# Patient Record
Sex: FEMALE | Race: WHITE | NOT HISPANIC OR LATINO | URBAN - METROPOLITAN AREA
[De-identification: names, ages, dates, MRNs, and addresses within clinical notes are randomized per-mention and may not be internally consistent; named-entity substitution may affect disease eponyms.]

---

## 2023-01-24 ENCOUNTER — APPOINTMENT (RX ONLY)
Dept: URBAN - METROPOLITAN AREA CLINIC 333 | Facility: CLINIC | Age: 33
Setting detail: DERMATOLOGY
End: 2023-01-24

## 2023-01-24 DIAGNOSIS — L72.8 OTHER FOLLICULAR CYSTS OF THE SKIN AND SUBCUTANEOUS TISSUE: ICD-10-CM | Status: INADEQUATELY CONTROLLED

## 2023-01-24 PROCEDURE — ? REFERRAL

## 2023-01-24 PROCEDURE — 99212 OFFICE O/P EST SF 10 MIN: CPT

## 2023-01-24 PROCEDURE — ? COUNSELING

## 2023-01-24 NOTE — HPI: CYST
Is This A New Presentation, Or A Follow-Up?: Follow Up Cyst
Additional History: \\n\\nPatient states there is multiple bumps near groin area.

## 2025-07-22 ENCOUNTER — LAB OUTSIDE AN ENCOUNTER (OUTPATIENT)
Dept: URBAN - METROPOLITAN AREA CLINIC 60 | Facility: CLINIC | Age: 35
End: 2025-07-22

## 2025-07-22 ENCOUNTER — OFFICE VISIT (OUTPATIENT)
Dept: URBAN - METROPOLITAN AREA CLINIC 60 | Facility: CLINIC | Age: 35
End: 2025-07-22
Payer: SELF-PAY

## 2025-07-22 DIAGNOSIS — R10.13 DYSPEPSIA: ICD-10-CM

## 2025-07-22 DIAGNOSIS — K52.9 CHRONIC DIARRHEA: ICD-10-CM

## 2025-07-22 DIAGNOSIS — R10.11 RUQ ABDOMINAL PAIN: ICD-10-CM

## 2025-07-22 PROCEDURE — 99204 OFFICE O/P NEW MOD 45 MIN: CPT

## 2025-07-22 RX ORDER — FERROUS SULFATE 325(65) MG
1 TABLET TABLET ORAL
Qty: 12 | Status: ACTIVE | COMMUNITY
Start: 2025-07-21

## 2025-07-22 RX ORDER — OMEPRAZOLE 20 MG/1
1 CAPSULE 1/2 TO 1 HOUR BEFORE MORNING MEAL CAPSULE, DELAYED RELEASE ORAL ONCE A DAY
Qty: 90 | Refills: 0 | OUTPATIENT
Start: 2025-07-22

## 2025-07-22 NOTE — PHYSICAL EXAM GASTROINTESTINAL
Abdomen , soft, upper abdominal tenderness, nondistended , no guarding or rigidity , no masses palpable , normal bowel sounds , Liver and Spleen,  no hepatosplenomegaly , liver nontender

## 2025-07-22 NOTE — HPI-TODAY'S VISIT:
Patient is a 35-year-old female presents today as a new patient with multiple GI complaints.  Patient states that for the past 3 weeks she has been experiencing intermittent heartburn symptoms.  She has not noticed any association with certain foods however sometimes she feels as though her food gets stuck in her lower esophagus and she has to drink fluids in order to get the food to go down completely.  She has tried OTC Pepcid and Tums without much symptom relief.  She has also been experiencing right upper quadrant pain intermittently for the past week.  She notices that her symptoms do worsen after eating a meal.  She denies any vomiting.  Lastly she has also had daily diarrhea for years with 2-3 bowel movements per day.  She denies hematochezia and melena.  She has never had an EGD or colonoscopy before.  Her only family history of colon cancer is her paternal grandmother.

## 2025-07-30 LAB
C DIFFICILE TOXINS A+B, EIA: NEGATIVE
C DIFFICILE, CYTOTOXIN B: (no result)
CALPROTECTIN, FECAL: 23
CAMPYLOBACTER CULTURE: (no result)
E COLI SHIGA TOXIN EIA: NEGATIVE
GIARDIA LAMBLIA AG, EIA: NEGATIVE
Lab: (no result)
OVA + PARASITE EXAM: (no result)
PANCREATIC ELASTASE, FECAL: >800
SALMONELLA/SHIGELLA SCREEN: (no result)
T-TRANSGLUTAMINASE (TTG) IGA: <2
T-TRANSGLUTAMINASE (TTG) IGG: 10

## 2025-07-31 ENCOUNTER — DASHBOARD ENCOUNTERS (OUTPATIENT)
Age: 35
End: 2025-07-31

## 2025-07-31 ENCOUNTER — TELEPHONE ENCOUNTER (OUTPATIENT)
Dept: URBAN - METROPOLITAN AREA CLINIC 63 | Facility: CLINIC | Age: 35
End: 2025-07-31

## 2025-08-04 ENCOUNTER — OFFICE VISIT (OUTPATIENT)
Dept: URBAN - METROPOLITAN AREA CLINIC 63 | Facility: CLINIC | Age: 35
End: 2025-08-04
Payer: SELF-PAY

## 2025-08-04 ENCOUNTER — LAB OUTSIDE AN ENCOUNTER (OUTPATIENT)
Dept: URBAN - METROPOLITAN AREA CLINIC 63 | Facility: CLINIC | Age: 35
End: 2025-08-04

## 2025-08-04 DIAGNOSIS — K52.9 CHRONIC DIARRHEA: ICD-10-CM

## 2025-08-04 DIAGNOSIS — R10.11 RUQ ABDOMINAL PAIN: ICD-10-CM

## 2025-08-04 DIAGNOSIS — K76.0 FATTY LIVER: ICD-10-CM

## 2025-08-04 DIAGNOSIS — R89.4 ABNORMAL CELIAC ANTIBODY PANEL: ICD-10-CM

## 2025-08-04 DIAGNOSIS — R10.13 DYSPEPSIA: ICD-10-CM

## 2025-08-04 PROCEDURE — 99214 OFFICE O/P EST MOD 30 MIN: CPT

## 2025-08-04 RX ORDER — FERROUS SULFATE 325(65) MG
1 TABLET TABLET ORAL
Qty: 12 | Status: ACTIVE | COMMUNITY

## 2025-08-04 RX ORDER — OMEPRAZOLE 20 MG/1
1 CAPSULE 1/2 TO 1 HOUR BEFORE MORNING MEAL CAPSULE, DELAYED RELEASE ORAL ONCE A DAY
Qty: 90 | Refills: 0 | Status: ACTIVE | COMMUNITY

## 2025-08-04 RX ORDER — OMEPRAZOLE 40 MG/1
1 CAPSULE 1/2 TO 1 HOUR BEFORE MORNING MEAL CAPSULE, DELAYED RELEASE ORAL ONCE A DAY
Qty: 90 | Refills: 1

## 2025-08-19 ENCOUNTER — OFFICE VISIT (OUTPATIENT)
Dept: URBAN - METROPOLITAN AREA CLINIC 60 | Facility: CLINIC | Age: 35
End: 2025-08-19

## 2025-08-27 LAB
ALBUMIN: 4.6
ALKALINE PHOSPHATASE: 72
ALPHA-1-ANTITRYPSIN, SERUM: 129
ALT (SGPT): 8
AMBIG ABBREV LP DEFAULT: (no result)
ANA DIRECT: NEGATIVE
AST (SGOT): 16
BASO (ABSOLUTE): 0
BASOS: 1
BILIRUBIN, TOTAL: 0.3
BUN/CREATININE RATIO: 9
BUN: 7
CALCIUM: 9.5
CARBON DIOXIDE, TOTAL: 20
CHLORIDE: 102
CHOLESTEROL, TOTAL: 157
CREATININE: 0.74
EGFR: 108
EOS (ABSOLUTE): 0
EOS: 1
GLOBULIN, TOTAL: 2.6
GLUCOSE: 86
HBSAG SCREEN: NEGATIVE
HCV AB: NON REACTIVE
HDL CHOLESTEROL: 53
HEMATOCRIT: 33.2
HEMATOLOGY COMMENTS:: (no result)
HEMOGLOBIN A1C: 5.3
HEMOGLOBIN: 9.9
HEP A AB, TOTAL: NEGATIVE
HEP B CORE AB, IGM: NEGATIVE
HEREDITARY  HEMOCHROMATOSIS: (no result)
IMMATURE CELLS: (no result)
IMMATURE GRANS (ABS): 0
IMMATURE GRANULOCYTES: 0
INTERPRETATION:: (no result)
LDL CALC COMMENT:: (no result)
LDL CHOL CALC (NIH): 89
LYMPHS (ABSOLUTE): 1.4
LYMPHS: 27
Lab: (no result)
MCH: 23.1
MCHC: 29.8
MCV: 78
MONOCYTES(ABSOLUTE): 0.4
MONOCYTES: 7
NEUTROPHILS (ABSOLUTE): 3.3
NEUTROPHILS: 64
NRBC: (no result)
PHENOTYPE (PI): (no result)
PLATELETS: 275
POTASSIUM: 4.3
PROTEIN, TOTAL: 7.2
RBC: 4.28
RDW: 16.2
SODIUM: 137
TRIGLYCERIDES: 80
VLDL CHOLESTEROL CAL: 15
WBC: 5.2